# Patient Record
Sex: FEMALE | Race: WHITE
[De-identification: names, ages, dates, MRNs, and addresses within clinical notes are randomized per-mention and may not be internally consistent; named-entity substitution may affect disease eponyms.]

---

## 2017-03-07 NOTE — CT
EXAM DESCRIPTION: 

Abdomen/Pelvis w/wo Contrast



CLINICAL HISTORY: 

HEMATURIA



COMPARISON: 

None. 



TECHNIQUE: CT of the abdomen and pelvis was performed with and

without IV contrast.. Multiple axial images and multiplanar

reconstructions were generated. 



FINDINGS: 

Lung bases: The visualized lung bases are clear.



The spleen, pancreas and liver are unremarkable. There is

postcholecystectomy prominence of the common bile duct which

measures 7 mm in diameter. Bilateral adrenal hyperplasia noted.

No parenchymal renal stones on today's study. Bilateral kidneys

enhance symmetrically. No renal masses noted. The urinary bladder

and bilateral ureters are grossly unremarkable. Patient is status

post hysterectomy. Multiple phleboliths noted on today's study.

No lymphadenopathy. No free air or free fluid.



Atherosclerotic disease of the abdominal aorta. Negative for

aneurysm.



Diverticulosis of the colon. Negative for diverticulitis. Small

bowel is unremarkable.







IMPRESSION: 

1.  No findings to account for patient's hematuria. No renal mass

or renal stone noted.

2.  Patient is status post cholecystectomy with prominence of

common bile duct measuring 7 mm in diameter. The pancreatic head

is unremarkable.

3.  Atherosclerotic disease of the abdominal aorta without

aneurysm and diverticulosis of colon noted. 







Electronically signed by:  Tong Ruvalcaba MD  3/7/2017 8:58 AM CST

## 2017-03-15 NOTE — MRI
EXAM DESCRIPTION: 

Brain w/wo Contrast



CLINICAL HISTORY: 

MEMORY LOSS



COMPARISON: 

None available



TECHNIQUE: 

Non contrast MRI of the brain is performed according to our usual

protocol including multiplanar multi sequence technique.



FINDINGS: 

Encephalomalacia of the left temporooccipital lobe. This is

within the region of a watershed infarct. The flow voids on

today's exam are unremarkable, however. Minimal T2

hyperintensities noted within the remaining cerebral white

matter. No evidence of mass or mass effect. No abnormal

extra-axial fluid. The ventricles are midline and unremarkable.

The basal cisterns are widely patent.



No restricted diffusion noted on today's study to suggest an

acute infarct.



The orbits and globes are unremarkable. The paranasal sinuses and

mastoid air cells are clear.



Opacification of the right mastoid air cells. The incompletely

imaged nasopharynx is grossly unremarkable with no definitive

mass noted.





IMPRESSION: 

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 There is

now encephalomalacia within this region.



No acute infarct on today's exam.



Effusion of the right mastoid air cells.



Electronically signed by:  Tong Ruvalcaba MD  3/15/2017 1:13 PM

CDT

## 2017-09-18 NOTE — RAD
EXAM DESCRIPTION: 



Chest,1 View



CLINICAL HISTORY: 



Chest pain



COMPARISON: 



None.



Findings: 



Single portable upright frontal view of the chest.

Cardiac silhouette and pulmonary vascularity are within normal

limits.

Lungs are clear without focal consolidative infiltrates.

No pleural effusion. No pneumothorax.

No acute osseous abnormality. 



Impression:



No radiographic evidence for acute cardiopulmonary process.



Electronically signed by:  Javier Alvarado MD  9/18/2017 3:09 PM CDT

Workstation: [id number]

## 2017-09-18 NOTE — ED.PDOC
History of Present Illness





- General


Chief Complaint: Chest Pain/MI


Stated Complaint: CHEST PAIN


Time Seen by Provider: 09/18/17 14:46


Source: patient





- History of Present Illness


Initial Comments: 





PT REPORTS ONSET OF MID STERNAL CHEST PAIN DESCRIBED AS PRESSURE THIS AM.  PT 

DENIES RADIATION AND STATES THAT PAIN LASTED APPROXIMATELY 1.5HRS BEFORE 

SUBSIDING.  PT DENIES ASSOCIATED SYMPTOMS OF SOB, NAUSEA, DIAPHORESIS.  PT 

REPORTS CLEAN CARDIAC CATH IN 2014 AFTER HAVING A CVA.


Timing/Duration: 1 hour


Severity/Quality: moderate, pressure


Location: substernal


Chest Pain Radiation: no radiation


Activities at Onset: none


Prior Chest Pain/Cardiac Workup: cardiac cath - 2014-NORMAL


Improving Factors: nothing


Worsening Factors: nothing


Nitro Today/Relief: no nitro taken today


Associated Symptoms: denies symptoms


Allergies/Adverse Reactions: 


Allergies





ACE Inhibitors Allergy (Unverified 09/18/17 15:18)


 


Acetaminophen [From Vicodin] Allergy (Verified 09/18/17 15:18)


 


Aspirin [From Talwin Compound] Allergy (Unverified 09/18/17 15:18)


 


Cephalexin [From Keflex] Allergy (Unverified 09/18/17 15:18)


 


Codeine Allergy (Unverified 09/18/17 15:18)


 


Doxycycline Allergy (Unverified 09/18/17 15:18)


 


Hydrocodone [From Vicodin] Allergy (Verified 09/18/17 15:18)


 


Metaproterenol [From Alupent] Allergy (Unverified 09/18/17 15:18)


 


Naloxone [From Talwin Nx] Allergy (Unverified 09/18/17 15:18)


 


Oxycodone [From Percodan] Allergy (Unverified 09/18/17 15:18)


 


Pentazocine [From Talwin Nx] Allergy (Unverified 09/18/17 15:18)


 


Tramadol Allergy (Verified 09/18/17 15:18)


 


Lisinopril Adverse Reaction (Unverified 09/18/17 15:18)


 


Meloxicam [From Mobic] Adverse Reaction (Unverified 09/18/17 15:18)


 


Milnacipran [From Savella] Adverse Reaction (Unverified 09/18/17 15:18)


 








Home Medications: 


Ambulatory Orders





Azilsartan Medoxomil-Chlorthal [Edarbyclor 40-12.5 mg] 1 ea PO DAILY 06/17/14 


Cetirizine HCl [Zyrtec] 10 mg PO DAILY 06/17/14 


Cyanocobalamin Inj [Vitamin B-12 Inj] 1,000 mcg IM ONCE 06/17/14 


Fluticasone Propionate (Nasal) [Flonase] 50 mcg NA BID 06/17/14 


Ipratropium Bromide Hfa [Atrovent Hfa] 17 mcg IN BID PRN 06/17/14 


Pantoprazole Tablet [Protonix] 40 mg PO DAILY 06/17/14 


Pregabalin [Lyrica] 100 mg PO BID 06/17/14 


Tiotropium Bromide Monohydrate [Spiriva Handihaler] 18 mcg IN DAILY 06/17/14 


Tramadol HCl 100 mg PO Q6HRS PRN 06/17/14 


Bentyl  12/16/14 


Cyclobenzaprine HCl [Flexeril] 5 mg PO BEDTIME PRN #10 tab 12/16/14 











Review of Systems





- Review of Systems


Constitutional: Denies: chills, fever


EENTM: Denies: ear pain, nose congestion


Respiratory: Denies: cough, short of breath


Cardiology: States: see HPI, chest pain.  Denies: palpitations


Gastrointestinal/Abdominal: Denies: diarrhea, nausea


Genitourinary: Denies: dysuria, frequency


Musculoskeletal: Denies: joint pain, joint swelling


Skin: Denies: dryness, lesions


Neurological: Denies: headache, numbness


Endocrine: States: no symptoms reported


Hematologic/Lymphatic: States: no symptoms reported





Past Medical History (General)





- Patient Medical History


Hx Seizures: No


Hx Stroke: Yes


Hx Dementia: No


Hx Asthma: No


Hx of COPD: Yes


Hx Cardiac Disorders: No


Hx Congestive Heart Failure: No


Hx Pacemaker: No


Hx Hypertension: Yes


Hx Thyroid Disease: No


Hx Diabetes: No


Hx Gastroesophageal Reflux: No


Hx Renal Disease: No


Hx Cancer: No


Hx of HIV: No


Hx Hepatitis C: No


Hx MRSA: No


Surgical History: appendectomy, cholecystectomy





- Vaccination History


Hx Tetanus, Diphtheria Vaccination: Yes


Hx Influenza Vaccination: Yes


Hx Pneumococcal Vaccination: Yes


Immunizations Up to Date: No





- Social History


Hx Tobacco Use: Yes - 1PPD


Hx Chewing Tobacco Use: No


Hx Alcohol Use: No


Hx Substance Use: No


Hx Substance Use Treatment: No


Hx Depression: No


Feels Threatened In Home Enviroment: No


Feels Threatened In a Relationship: No


Hx Physical Abuse: No


Hx Emotional Abuse: No


Hx Suspected Abuse: No





- Activities of Daily Living


Hospice Agency (if applicable):: None





- Female History


Patient is a Female of Child Bearing Age (10 -59 yrs old): No


Patient Pregnant: No





Family Medical History





- Family History


  ** Sister


Family History: Unknown


Living Status: Unknown


Hx Family Diabetes: Yes


Hx Family Cancer: Yes





Physical Exam





- Physical Exam


General Appearance: Alert, Comfortable, No apparent distress, Well Developed, 

Well Groomed, Well Hydrated


Eyes, Ears, Nose, Throat Exam: normal ENT inspection


Neck: normal inspection


Respiratory: lungs clear, normal breath sounds, no respiratory distress


Cardiovascular/Chest: regular rate, rhythm, no murmur


Gastrointestinal/Abdominal: non tender, soft


Extremity: non-tender, normal inspection, no pedal edema


Neurologic: no motor/sensory deficits, alert, normal mood/affect, oriented x 3


Skin Exam: normal color, warm/dry





Progress





- Progress


Progress: 





09/18/17 14:20


PT RESTING COMFORTABLY NO RE-OCCURRENCE OF CHEST PAIN WHILE IN THE ED.


09/18/17 17:39


PT RESTING COMFORTABLY NO RE-OCCURRENCE OF CHEST PAIN.  2ND SET OF CARDIAC 

ENZYMES NEGATIVE.  





- Results/Orders


Results/Orders: 





 





09/18/17 14:46


IV Care:Saline Lock per Protoc QSHIFT 


Telemetry .ONCE 


Sodium Chloride 0.9% (Flush) [Saline Flush Syringe]   10 ml IV PRN PRN 


EKG Stat 


Pulse Ox Stat 


Pulse Oximetry Assessment DAILY 








 Laboratory Results - last 24 hr











  09/18/17 09/18/17 09/18/17





  14:50 16:35 16:35


 


WBC  8.0  


 


RBC  4.35  


 


Hgb  13.3  


 


Hct  39.6  


 


MCV  91.0  


 


MCH  30.6  


 


MCHC  33.6  


 


RDW  13.3  


 


Plt Count  256  


 


MPV  8.0  


 


Absolute Neuts (auto)  5.00  


 


Absolute Lymphs (auto)  2.40  


 


Absolute Monos (auto)  0.40  


 


Absolute Eos (auto)  0.10  


 


Absolute Basos (auto)  0.00  


 


Neutrophils %  62.5  


 


Lymphocytes %  30.1  


 


Monocytes %  5.6  


 


Eosinophils %  1.2  


 


Basophils %  0.6  


 


PT  10.9  


 


INR  0.960  


 


PTT (SP)  31.7  


 


Sodium  141  


 


Potassium  3.2 L  3.2 L 


 


Chloride  105  


 


Carbon Dioxide  29  


 


Anion Gap  10.2 L  


 


BUN  7  


 


Creatinine  0.74  


 


BUN/Creatinine Ratio  9.5 L  


 


Random Glucose  112 H  


 


Serum Osmolality  280.0  


 


Calcium  9.0  


 


Magnesium  2.0  


 


Creatine Kinase  86   81


 


CK-MB (CK-2)  1.2   1.1


 


CK-MB (CK-2) %  Not Reportable   Not Reportable


 


Troponin I  < 0.02   < 0.02


 


B-Natriuretic Peptide  10.0  














- EKG/XRAY/CT


EKG: Sinus - @68BPM, RBBB - NL AXIS, no ST T wave changes - NO OLD FOR 

COMPARISON


XRAY: chest - NO ACTIVE DISEASE AS PER RAD





Departure





- Departure


Clinical Impression: 


 Chest pain





Time of Disposition: 17:41


Disposition: Discharge to Home or Self Care


Condition: Good


Departure Forms:  ED Discharge - Pt. Copy, Patient Portal Self Enrollment


Instructions:  DI for Chest Pain


Referrals: 


Sammy Potts MD [Primary Care Provider] - 1-2 Weeks


Home Medications: 


Ambulatory Orders





Azilsartan Medoxomil-Chlorthal [Edarbyclor 40-12.5 mg] 1 ea PO DAILY 06/17/14 


Cetirizine HCl [Zyrtec] 10 mg PO DAILY 06/17/14 


Cyanocobalamin Inj [Vitamin B-12 Inj] 1,000 mcg IM ONCE 06/17/14 


Fluticasone Propionate (Nasal) [Flonase] 50 mcg NA BID 06/17/14 


Ipratropium Bromide Hfa [Atrovent Hfa] 17 mcg IN BID PRN 06/17/14 


Pantoprazole Tablet [Protonix] 40 mg PO DAILY 06/17/14 


Pregabalin [Lyrica] 100 mg PO BID 06/17/14 


Tiotropium Bromide Monohydrate [Spiriva Handihaler] 18 mcg IN DAILY 06/17/14 


Tramadol HCl 100 mg PO Q6HRS PRN 06/17/14 


Bentyl  12/16/14 


Cyclobenzaprine HCl [Flexeril] 5 mg PO BEDTIME PRN #10 tab 12/16/14

## 2017-11-11 NOTE — US
Procedure: Pelvic ultrasound.



CLINICAL INDICATION: Pelvic pain. Status post hysterectomy



Exam Date:  11/9/2017 10:11 AM CST



COMPARISON: None available.



TECHNIQUE: Transabdominal and endovaginal sonography was

performed



FINDINGS:

Status post hysterectomy.



The right ovary is normal in size and echogenicity.  Normal

arterial flow without solid or cystic mass lesion.



Status post left oophorectomy.



There is no free fluid.





IMPRESSION:



1. Status post hysterectomy and left oophorectomy. Otherwise,

unremarkable pelvic ultrasound. Normal-appearing right ovary.

 



Electronically signed by:  Star Starr MD  11/11/2017 5:58 AM

CST Workstation: [id number]

## 2018-11-10 NOTE — CT
EXAM DESCRIPTION:



 Abdomen/Pelvis w/o Contrast



CLINICAL HISTORY: 



58 years  Female  back pain and hematuria 



COMPARISON: 



Contrast enhanced study dated March 7, 2017.



TECHNIQUE: 



Noncontrast axial scans of the abdomen and pelvis were obtained.

The lack of any contrast administration limits evaluation of

certain areas. Sagittal and coronal reformatted images were

performed.

This exam was performed according to our departmental

dose-optimization program, which includes automated exposure

control, adjustment of the mA and/or kV according to patient size

and/or use of iterative reconstruction technique.



FINDINGS: 



The lung bases are unremarkable. Bilateral breast implants are

noted. The gallbladder is surgically absent. There is an apparent

small cyst in the left kidney, measuring about 7 mm, unchanged in

size and with a possible adjacent tiny calcification. Otherwise,

no renal, ureteral, or bladder calculi are identified, and there

is no evidence of obstructive uropathy on either side. The liver,

spleen, and pancreas appear essentially unremarkable, within

technical limitations of the study. Borderline mild biliary

ductal prominence is consistent with postcholecystectomy status

and appears unchanged. There appears to be a small low density

nodule in each adrenal gland, measuring up to about 9 mm and

between 0 and -10 Hounsfield units in density, most likely due to

adenomas and unchanged in size. No follow-up imaging is suggested

for these findings. There are aorto iliac calcifications, with no

evidence of abdominal aortic aneurysm. No gross para-aortic lymph

node enlargement is identified.



No obvious abnormal masses or fluid collections are seen in the

pelvis. The appendix is not clearly identified. There are

diverticula scattered throughout the colon, mostly on the left

side, with no evidence of diverticulitis, bowel obstruction, or

pneumoperitoneum. Diverticulosis may be minimally increased in

the interval. There is postoperative change in the pelvis,

including absence of the uterus. Calcified pelvic phleboliths are

noted. The bladder is unremarkable as visualized. Regional bony

structures appear intact.



IMPRESSION: 





1. No evidence of urinary tract obstruction or calculus except

for a possible tiny calcification adjacent to a small cyst in the

upper pole of the left kidney.

2. Diverticulosis in the colon.

3. Other stable appearing minor findings and chronic changes as

described above.



Electronically signed by:  Ezequiel Robin MD  11/10/2018 12:20

PM [facility] Workstation: [id number]

## 2018-11-10 NOTE — ED.PDOC
History of Present Illness





- General


Chief Complaint: Abdominal Pain


Stated Complaint: RLQ/flank discomfort


Time Seen by Provider: 11/10/18 10:13


Source: patient


Exam Limitations: no limitations





- History of Present Illness


Initial Comments: 


patient comes in today for three-day history of pain to the right flank area 

that radiates down to the right groin.  Patient states she did pull a wagon 

around her garden right before that happened but doesn't remember a specific 

incident where she felt it pull or hurt.  Patient states several hours after 

that she noted that she had a dull ache in the area that has progressively 

worsened over the past 3 days.  Patient states the pain is constant, not 

particularly worse with movement, and not associated with dysuria, fever, or 

chills.  Patient had no nausea or vomiting and she had normal bowel movement 

yesterday.  Patient has no history of kidney stones and last kidney infection 

was more than 30 years ago.  Patient has a past medical history for a stroke 

that left her with some aphasia and hypertension that she does not take 

medication for secondary to cost of her medicine.





Timing/Duration: other - 3 day history 


Severity: moderate


Improving Factors: nothing


Worsening Factors: nothing


Associated Symptoms: denies symptoms


Allergies/Adverse Reactions: 


Allergies





ACE Inhibitors Allergy (Unverified 09/18/17 15:18)


 


Acetaminophen [From Vicodin] Allergy (Verified 09/18/17 15:18)


 


Aspirin [From Talwin Compound] Allergy (Unverified 09/18/17 15:18)


 


Cephalexin [From Keflex] Allergy (Unverified 09/18/17 15:18)


 


Codeine Allergy (Unverified 09/18/17 15:18)


 


Doxycycline Allergy (Unverified 09/18/17 15:18)


 


Hydrocodone [From Vicodin] Allergy (Verified 09/18/17 15:18)


 


Metaproterenol [From Alupent] Allergy (Unverified 09/18/17 15:18)


 


Naloxone [From Talwin Nx] Allergy (Unverified 09/18/17 15:18)


 


Oxycodone [From Percodan] Allergy (Unverified 09/18/17 15:18)


 


Pentazocine [From Talwin Nx] Allergy (Unverified 09/18/17 15:18)


 


Tramadol Allergy (Verified 09/18/17 15:18)


 


Lisinopril Adverse Reaction (Unverified 09/18/17 15:18)


 


Meloxicam [From Mobic] Adverse Reaction (Unverified 09/18/17 15:18)


 


Milnacipran [From Savella] Adverse Reaction (Unverified 09/18/17 15:18)


 








Home Medications: 


Ambulatory Orders





Azilsartan Medoxomil-Chlorthal [Edarbyclor 40-12.5 mg] 1 ea PO DAILY 06/17/14 


Cetirizine HCl [Zyrtec] 10 mg PO DAILY 06/17/14 


Cyanocobalamin Inj [Vitamin B-12 Inj] 1,000 mcg IM ONCE 06/17/14 


Fluticasone Propionate (Nasal) [Flonase] 50 mcg NA BID 06/17/14 


Ipratropium Bromide Hfa [Atrovent Hfa] 17 mcg IN BID PRN 06/17/14 


Pantoprazole Tablet [Protonix] 40 mg PO DAILY 06/17/14 


Pregabalin [Lyrica] 100 mg PO BID 06/17/14 


Tiotropium Bromide Monohydrate [Spiriva Handihaler] 18 mcg IN DAILY 06/17/14 


Tramadol HCl 100 mg PO Q6HRS PRN 06/17/14 


Bentyl  12/16/14 


Cyclobenzaprine HCl [Flexeril] 5 mg PO BEDTIME PRN #10 tab 12/16/14 


Sulfa/Trimeth 800/160 (Ds) Tab [Bactrim DS] 1 tablet PO BID #20 tab 11/10/18 


amLODIPine BESYLATE [Norvasc] 5 mg PO DAILY #30 tab 11/10/18 











Review of Systems





- Review of Systems


Constitutional: States: no symptoms reported.  Denies: chills, fever, weakness


EENTM: States: no symptoms reported.  Denies: eye pain, nose pain, nose 

congestion, throat pain


Respiratory: States: no symptoms reported.  Denies: cough, short of breath, 

wheezing


Cardiology: States: no symptoms reported.  Denies: chest pain, edema, 

palpitations


Gastrointestinal/Abdominal: States: see HPI.  Denies: diarrhea, nausea, vomiting


Genitourinary: Denies: dysuria, frequency, hematuria


Musculoskeletal: States: back pain


Skin: States: no symptoms reported


Neurological: States: no symptoms reported





Past Medical History (General)





- Patient Medical History


Hx Seizures: No


Hx Stroke: Yes


Hx Dementia: No


Hx Asthma: No


Hx of COPD: Yes


Hx Cardiac Disorders: No


Hx Congestive Heart Failure: No


Hx Pacemaker: No


Hx Hypertension: Yes


Hx Thyroid Disease: No


Hx Diabetes: No


Hx Gastroesophageal Reflux: No


Hx Renal Disease: No


Hx Cancer: No


Hx of HIV: No


Hx Hepatitis C: No


Hx MRSA: No


Surgical History: appendectomy, cholecystectomy, Hysterectomy





- Vaccination History


Hx Tetanus, Diphtheria Vaccination: Yes


Hx Influenza Vaccination: Yes - 2017


Hx Pneumococcal Vaccination: Yes - 2017





- Social History


Hx Tobacco Use: Yes - 1PPD


Hx Chewing Tobacco Use: No


Hx Alcohol Use: No


Hx Substance Use: No


Hx Substance Use Treatment: No


Hx Depression: No


Hx Physical Abuse: No


Hx Emotional Abuse: No


Hx Suspected Abuse: No





- Female History


Patient Pregnant: No





Family Medical History





- Family History


  ** Sister


Family History: Unknown


Living Status: Unknown


Hx Family Diabetes: Yes


Hx Family Cancer: Yes





Physical Exam





- Physical Exam


General Appearance: Alert, No apparent distress


Eye Exam: bilateral normal


Ears, Nose, Throat: hearing grossly normal, normal ENT inspection, normal 

pharynx


Neck: non-tender, full range of motion, supple, normal inspection


Respiratory: chest non-tender, lungs clear, normal breath sounds


Cardiovascular/Chest: normal peripheral pulses, regular rate, rhythm, no edema, 

no murmur


Gastrointestinal/Abdominal: normal bowel sounds, non tender, soft


Back Exam: normal inspection, no CVA tenderness, no vertebral tenderness


Extremity: non-tender


Neurologic: alert, oriented x 3





Progress





- Progress


Progress: 





11/10/18 11:30


patient informed of initial results.  Will check CT for possible kidney stones.

  Still hurting but extensive list of allergies.  She can safely take Naproxen 

and believes Toradol has been fine in the past.  Will give Toradol shot now


11/10/18 12:32


discussed with patient her results. Will have her start Bactrim DS for UTI with 

hematuria and follow up with PCP.  Norvasc started for her BP and she will 

discuss if Neurontin might be used instead of Lyrica for her Fibromyalgia as 

the expense of medications makes her non compliant with her HTN medication.  





- Results/Orders


Results/Orders: 





 





11/10/18 10:56


URINE CULTURE W/COLONY COUNT Stat 





11/10/18 11:24


Abdoment/Pelvis w/o Contrast [CT] Stat 








 Laboratory Results











WBC  6.1 K/mm3 (4.8-10.8)   11/10/18  10:29    


 


RBC  4.85 M/mm3 (4.20-5.40)   11/10/18  10:29    


 


Hgb  14.9 gm/dL (12.0-16.0)   11/10/18  10:29    


 


Hct  44.2 % (36.0-47.0)   11/10/18  10:29    


 


MCV  91.2 fl (81.0-99.0)   11/10/18  10:29    


 


MCH  30.8 pg (27.0-31.0)   11/10/18  10:29    


 


MCHC  33.7 g/dL (33.0-37.0)   11/10/18  10:29    


 


RDW  13.3 % (11.5-14.5)   11/10/18  10:29    


 


Plt Count  306 K/mm3 (130-400)   11/10/18  10:29    


 


MPV  7.9 fl (7.40-10.4)   11/10/18  10:29    


 


Absolute Neuts (auto)  3.30 K/uL (1.8-6.8)   11/10/18  10:29    


 


Absolute Lymphs (auto)  2.20 K/uL (1.0-3.4)   11/10/18  10:29    


 


Absolute Monos (auto)  0.40 K/uL (0.2-0.8)   11/10/18  10:29    


 


Absolute Eos (auto)  0.10 K/uL (0.0-0.4)   11/10/18  10:29    


 


Absolute Basos (auto)  0.10 K/uL (0.0-0.1)   11/10/18  10:29    


 


Neutrophils %  54.8 % (42.0-78.0)   11/10/18  10:29    


 


Lymphocytes %  36.0 % (20.0-50.0)   11/10/18  10:29    


 


Monocytes %  6.7 % (2.0-9.0)   11/10/18  10:29    


 


Eosinophils %  1.5 % (1.0-5.0)   11/10/18  10:29    


 


Basophils %  1.0 % (0.0-2.0)   11/10/18  10:29    


 


Sodium  138 mmol/L (135-145)   11/10/18  10:29    


 


Potassium  3.4 mmol/L (3.6-5.0)  L  11/10/18  10:29    


 


Chloride  101 mmol/L (101-111)   11/10/18  10:29    


 


Carbon Dioxide  28 mmol/L (21-31)   11/10/18  10:29    


 


Anion Gap  12.4  (12-18)   11/10/18  10:29    


 


BUN  < 5 mg/dL (7-18)  L  11/10/18  10:29    


 


Creatinine  0.81 mg/dL (0.6-1.3)   11/10/18  10:29    


 


BUN/Creatinine Ratio  6.2  (10-20)  L  11/10/18  10:29    


 


Random Glucose  97 mg/dL ()   11/10/18  10:29    


 


Serum Osmolality  272.9 mOsm/L (275-295)  L  11/10/18  10:29    


 


Calcium  9.1 mg/dL (8.4-10.2)   11/10/18  10:29    


 


Total Bilirubin  0.6 mg/dL (0.2-1.0)   11/10/18  10:29    


 


AST  17 IU/L (10-42)   11/10/18  10:29    


 


ALT  10 IU/L (10-60)   11/10/18  10:29    


 


Alkaline Phosphatase  65 IU/L ()   11/10/18  10:29    


 


Serum Total Protein  6.6 gm/dL (6.4-8.2)   11/10/18  10:29    


 


Albumin  4.1 g/dl (3.2-5.5)   11/10/18  10:29    


 


Globulin  2.5 gm/dL (2.3-3.5)   11/10/18  10:29    


 


Albumin/Globulin Ratio  1.6  (1.1-1.9)   11/10/18  10:29    


 


Urine Color  Yellow  (Yellow)   11/10/18  10:56    


 


Urine Appearance  Clear  (Clear)   11/10/18  10:56    


 


Urine pH  7.0  (4.5-7.8)   11/10/18  10:56    


 


Ur Specific Gravity  1.010  (1.005-1.030)   11/10/18  10:56    


 


Urine Protein  Negative mg/dL  11/10/18  10:56    


 


Urine Glucose (UA)  Negative mg/dL (Negative)   11/10/18  10:56    


 


Urine Ketones  Negative mg/dL (NEGATIVE)   11/10/18  10:56    


 


Urine Blood  Moderate  (Negative)  H  11/10/18  10:56    


 


Urine Nitrite  Negative   11/10/18  10:56    


 


Urine Bilirubin  Negative  (NEGATIVE)   11/10/18  10:56    


 


Urine Urobilinogen  0.2 mg/dL (0.2-1.0)   11/10/18  10:56    


 


Ur Leukocyte Esterase  Moderate  (Negative)  H  11/10/18  10:56    


 


Urine RBC  1-3 /hpf  11/10/18  10:56    


 


Urine WBC  1-3 /hpf  11/10/18  10:56    


 


Ur Epithelial Cells  0 /hpf  11/10/18  10:56    


 


Urine Bacteria  0   11/10/18  10:56    








CT shows no acute process or obstruction, possible small calculus.  





Departure





- Departure


Clinical Impression: 


UTI (urinary tract infection)


Qualifiers:


 Urinary tract infection type: acute cystitis Hematuria presence: with 

hematuria Qualified Code(s): N30.01 - Acute cystitis with hematuria





Disposition: Discharge to Home or Self Care


Condition: Good


Departure Forms:  ED Discharge - Pt. Copy, Patient Portal Self Enrollment


Instructions:  DI for Abdominal Pain-Adult


Referrals: 


Sammy Potts MD [Primary Care Provider] - 1-2 Weeks


Prescriptions: 


amLODIPine BESYLATE [Norvasc] 5 mg PO DAILY #30 tab


Sulfa/Trimeth 800/160 (Ds) Tab [Bactrim DS] 1 tablet PO BID #20 tab


Home Medications: 


Ambulatory Orders





Azilsartan Medoxomil-Chlorthal [Edarbyclor 40-12.5 mg] 1 ea PO DAILY 06/17/14 


Cetirizine HCl [Zyrtec] 10 mg PO DAILY 06/17/14 


Cyanocobalamin Inj [Vitamin B-12 Inj] 1,000 mcg IM ONCE 06/17/14 


Fluticasone Propionate (Nasal) [Flonase] 50 mcg NA BID 06/17/14 


Ipratropium Bromide Hfa [Atrovent Hfa] 17 mcg IN BID PRN 06/17/14 


Pantoprazole Tablet [Protonix] 40 mg PO DAILY 06/17/14 


Pregabalin [Lyrica] 100 mg PO BID 06/17/14 


Tiotropium Bromide Monohydrate [Spiriva Handihaler] 18 mcg IN DAILY 06/17/14 


Tramadol HCl 100 mg PO Q6HRS PRN 06/17/14 


Bentyl  12/16/14 


Cyclobenzaprine HCl [Flexeril] 5 mg PO BEDTIME PRN #10 tab 12/16/14 


Sulfa/Trimeth 800/160 (Ds) Tab [Bactrim DS] 1 tablet PO BID #20 tab 11/10/18 


amLODIPine BESYLATE [Norvasc] 5 mg PO DAILY #30 tab 11/10/18 








Additional Instructions: 


follow up with PCP on Monday for hematuria and HTN.  Return to ER for increase 

in pain, intractable emesis

## 2018-12-17 NOTE — CT
Procedure:  CT LUNG SCREENING        



Exam Date:  7/17/2018.



Ordering Provider:  Júnior Saez



Clinical Indication:  HX OF TOBACCO USE. 40 pack years. This

patient meets eligibility criteria for low-dose CT lung cancer

screening.



Comparison: CT scan of the chest without contrast 2/19/2015.



Technique:  Using a multislice scanner, sequential helical axial

imaging was obtained in the thorax, 2.5 mm thickness, 2.5 mm

separation, from the level of the thoracic inlet through the lung

bases without IV contrast. A low dose protocol was utilized:

CTDI: 1.75 mGy.  120. kVp.  45 mA.  DLP: 63.94 mGy-centimeters.

2D sagittal and coronal reconstructed images, 6.0 mm thickness,

were obtained. This exam was performed according to our

departmental dose optimization program which includes use of

automated exposure control, adjustment of the mA and/or kV

according to patient size and/or use of iterative reconstruction

technique.  Nodule measurements under 10 mm are given as mean

value of 3 axes diameters.



FINDINGS: 

Lungs and large airways: Bilateral minimal pulmonary blebs in a

centrilobular pattern. 8 mm groundglass density associated with

the posterior lateral pleura in the right upper lobe on axial

sequence 2, image 19. Scarring in the posterior medial recess of

the right lower lobe abutting the posterior right hemidiaphragm

in the posterior right pleural recess. Minimal pleural

parenchymal scarring in the medial left lower lobe base. No

enlarged solid or semisolid nodules, no masses or infiltrates

bilaterally.



Pleura: Bilateral regions of focal thickening. No calcification.

No bilateral effusion or pneumothorax



Mediastinum and ana: evaluation limited by low dose technique

and lack of IV contrast.  Negative.



Heart and great vessels: Atherosclerotic calcification and some

of the brachiocephalic vessels and the thoracic aorta including

the arch.



Chest wall, lower neck, axillae: Evaluation also limited by same

factors as described above.  Bilateral retromuscular saline

implants. No complications are noted. Bilateral calcifications

anterior to the implants. Bilateral small nodes abutting the

lateral implants.



Upper abdomen: No fluid or free air in the peritoneal space.

Normal CT density and size of the adrenal glands and spleen.

Stomach distended with food.



Osseous structures: Evaluation limited by low dose MIP technique.

 No blastic or lytic lesions. Minimal spondylosis of the thoracic

spine and trace dextroscoliosis.



IMPRESSION:



1. 8-mm groundglass density associated with the lateral posterior

pleura in the right apex slightly larger compared to the prior

study which is most likely due to different technique. No

abnormal parenchymal nodules, masses or infiltrates. No pleural

effusion or pneumothorax. Bilateral saline intramuscular breast

implants.  Recommendations based upon [employer] [facility] [employer]

guidelines and lung RADS system. Please see below for Lung RADS

category and FOLLOW-UP.*



*Lung RADS category CATEGORY 2- Nodules with a very low

likelihood (less than 1%) of becoming a clinically active cancer

due to size or lack of growth. 



Nodules: Solid or part solid nodule(s) less than 6mm, new solid

nodule less than 4mm.  Ground glass nodule(s) less than 20mm or

unchanged or slow growing ground glass nodule 20mm or greater.

Cat 3 or 4 nodule unchanged for 3 or more months. 



FOLLOW-UP:  Continue annual screening with a Low Dose Chest CT in

12 months for re-evaluation. 



Electronically signed by:  Xu Rangel MD  12/17/2018 1:14 PM

[facility] Workstation: [id number]

## 2019-04-24 NOTE — US
EXAM DESCRIPTION: Carotid Duplex



CLINICAL HISTORY: HX TIA



COMPARISON: None Available.



TECHNIQUE: Carotid Doppler ultrasound



FINDINGS:



Right



Submitted images show soft plaque at the right carotid

bifurcation.



The following flow velocities were obtained:



Common carotid artery peak systolic flow velocity measures 89

centimeters per second.



Internal carotid artery peak systolic flow velocity measures

55-82 centimeters per second.



External carotid artery peak systolic flow velocity measures 59

centimeters per second.



Flow in the right vertebral artery is antegrade.



The right internal carotid to common carotid peak systolic flow

velocity ratio equals 0.9 which is normal.



Left



Submitted images show mild soft plaque at the left carotid

bifurcation.



The following flow velocities were obtained:



Common carotid artery peak systolic flow velocity measures 74-81

centimeters per second.



Internal carotid artery peak systolic flow velocity measures

74-84 centimeters per second.



External carotid artery peak systolic flow velocity measures 55

centimeters per second.



Flow in the left vertebral artery is antegrade.



The left internal carotid to common carotid peak systolic flow

velocity ratio of 1.0  is normal.



IMPRESSION:



No hemodynamically significant stenosis.



Electronically signed by:  Sean Hunter MD  4/24/2019 2:32

PM CDT Workstation: [id number]

## 2019-04-24 NOTE — MRI
EXAM DESCRIPTION: Brain w/oContrast



CLINICAL HISTORY: G51.0



COMPARISON: None available



TECHNIQUE: Non contrast MRI of the brain is performed according

to our usual protocol including multiplanar multi sequence

technique.



FINDINGS:



Sagittal T1 images show intact corpus callosum. Normal pituitary

gland with normal T1 appearance of the andre and medulla and upper

cervical cord. Normal signal intensity within the clivus and

calvarium.



Axial T2 fat sat images reveal preservation of intracranial

vascular flow voids. Normal gray matter T2 signal intensity.

Patchy increased signal intensity in the periventricular white

matter and in the white matter of the left parietal lobe at the

site of an old infarction. Prominent ventricles and sulci

consistent with age-related cerebral volume loss. The globes

appear intact and symmetrical. No abnormal  fluid signal in the

paranasal sinuses, tympanic cavities or left mastoid air cells.

Minimal fluid in the inferior right mastoid air cells.



Axial flair images show scattered foci of increased signal

intensity in the subcortical and central white matter of both

cerebral hemispheres. Old infarction of the left parietal lobe

involves white matter more than gray matter.



Diffusion weighted images are negative for  focal intense

increased signal intensity in the brain parenchyma to suggest

restricted diffusion. ADC mapping is negative.



Axial T1 images show normal gray-white matter differentiation. No

high signal intensity hemorrhagic lesion of the brain parenchyma.

No subdural hematoma.



Axial susceptibility weighted images are negative for focal

signal loss to suggest abnormal brain parenchymal calcification

or hemosiderin deposition.



IMPRESSION:



No acute intracranial pathologic process.



Old left parietal infarction.



Chronic microvascular ischemic changes in cerebral white matter.





Electronically signed by:  Sean Hunter MD  4/24/2019 2:40

PM CDT Workstation: [id number]

## 2019-10-28 NOTE — RAD
EXAM DESCRIPTION: Chest,2 Views



CLINICAL HISTORY: COUGH



COMPARISON: Previous study September 18, 2017



TECHNIQUE: PA/lateral



FINDINGS: Bilateral breast implants. Heart size is normal with

normal pulmonary vascularity. No pleural effusion or

pneumothorax. Lungs are clear with no consolidating infiltrate.

Lateral view shows intact sternum and T-spine.



IMPRESSION:



No acute process is identified in the chest.



Electronically signed by:  Sean Hunter MD  10/28/2019 2:00

PM CDT Workstation: [id number]

## 2019-12-30 NOTE — CT
Procedure:  CT LUNG SCREENING        



Exam Date:  12/30/2019.



Ordering Provider:  Júnior Saez



Clinical Indication:  PERSONAL HISTORY OF TOBACCO USE . Current

cigarette smoker. 47 pack-years. This patient meets eligibility

criteria for low-dose CT lung cancer screening.



Comparison: Low-dose CT lung cancer screening examination

December 2018.



Technique:  Using a multislice scanner, sequential helical axial

imaging was obtained in the thorax, 2.5 mm thickness, 2.5 mm

separation, from the level of the thoracic inlet through the lung

bases without IV contrast. A low dose protocol was utilized for

BMI less than 30: BMI: 29.  CTDI: 1.76 mGy.  120. kVp.  45 mA. 

DLP 63.3 mGy-cm.  2D sagittal and coronal reconstructed images,

6.0 mm thickness, were obtained. This exam was performed

according to departmental dose optimization program which

includes use of automated exposure control, adjustment of the mA

and/or kV according to patient size and/or use of iterative

reconstruction technique.  Nodule measurements under 10 mm are

given as mean value of 3 axes diameters.



FINDINGS: 

Lungs and large airways: 8 mm groundglass nodule versus pleural

thickening showing no interval change on axial image 2/21-22.

Bilateral subpleural densities in the apices are stable.

Bilateral small uniform blebs more prevalent in the upper lung

fields. Bilateral focal thickening of the fissures is stable.

Minimal pleural-parenchymal thickening in the base of the left

lower lobe. No new nodules or masses. No new focal infiltrates.



Pleura and space: Calcification pleura right lateral

hemidiaphragm stable. Bilateral focal pleural thickening more

prevalent in the apices stable.



Mediastinum and ana: evaluation limited by low dose technique

and lack of IV contrast.  Small lymph nodes no dominant masses.

Stable.



Heart and great vessels: Atherosclerotic calcification left main

coronary artery and also aortic arch. No cardiomegaly.



Chest wall, lower neck, axillae: Evaluation also limited by same

factors as described above.  Bilateral retro-muscular saline

implants with bilateral calcifications associated with the

fibroglandular tissues. Capsular margins appear intact. No new

lymph nodes or soft tissue masses.



Upper abdomen: Evaluation limited by low-dose technique. No free

air or free fluid. Slight prominence of the left adrenal gland

compared to the right stable since the prior study with

Hounsfield density approximately 0 - +3. Surgical clips in the

gallbladder fossa with no fluid. Included organs normal density.



Osseous structures: Evaluation limited by low dose MIP technique.

 Minimal spondylosis in the thoracic spine. No lytic or blastic

lesions.



IMPRESSION: 

1. Stable 8 mm groundglass nodule in the subpleural right upper

lobe versus pleural thickening. No new nodules or masses. No new

focal infiltrates..  [facility] Best [facility]

Recommendations:  please see below for Lung RADS category and

FOLLOW-UP.*



*Lung RADS category CATEGORY 2- Nodules with a very low

likelihood (less than 1%) of becoming a clinically active cancer

due to size or lack of growth. 



Nodules: Perifissural nodule(s) < 10 mm. (526mm3). Solid or part

solid nodule(s) less than 6mm (113.1 mm3), new solid nodule less

than 4mm (33.5 mm3).  Ground glass nodule(s) less than 30mm

(32762.2 mm3)  or unchanged or slow growing ground glass nodule

30mm or greater. Cat 3 or 4 nodule unchanged for 3 or more

months. 



FOLLOW-UP:  Continue annual screening with a Low Dose Chest CT in

12 months for re-evaluation. 



2. According to PACS history at [facility], patient has not had breast

screening mammography examination since April 2017. Consider

re-enrollment of patient into [facility] breast screening mammography

program, if not performed elsewhere in the past 12 months.



Electronically signed by:  Xu Rangel MD  12/30/2019 5:52 PM

[facility] Workstation: [id number]

## 2020-06-03 ENCOUNTER — HOSPITAL ENCOUNTER (OUTPATIENT)
Dept: HOSPITAL 39 - LAB.O | Age: 60
End: 2020-06-03
Attending: FAMILY MEDICINE
Payer: MEDICARE

## 2020-06-03 DIAGNOSIS — I63.9: ICD-10-CM

## 2020-06-03 DIAGNOSIS — I10: Primary | ICD-10-CM

## 2020-06-03 DIAGNOSIS — Z13.220: ICD-10-CM

## 2020-06-03 DIAGNOSIS — R41.3: ICD-10-CM

## 2020-06-10 ENCOUNTER — HOSPITAL ENCOUNTER (OUTPATIENT)
Dept: HOSPITAL 39 - CT | Age: 60
End: 2020-06-10
Attending: FAMILY MEDICINE
Payer: MEDICARE

## 2020-06-10 DIAGNOSIS — R31.9: Primary | ICD-10-CM

## 2020-06-10 NOTE — CT
EXAM DESCRIPTION: Abdomen/Pelvis w/wo Contrast



CLINICAL HISTORY: 59 years Female, BLOOD IN URINE HEMATURIA



TECHNIQUE: This exam was performed according to our departmental

dose-optimization program, which includes automated exposure

control, adjustment of the mA and/or kV according to patient size

and/or use of iterative reconstruction technique.



COMPARISON: March 7, 2017



FINDINGS:

Visualized lung bases are grossly unremarkable.



The liver is unremarkable. No suspicious hepatic lesion. No

biliary dilatation. Cholecystectomy. The portal vein is patent.



Stable benign bilateral adrenal adenomas. The spleen and pancreas

are unremarkable.



No hydronephrosis. No urolithiasis. Symmetric renal parenchymal

enhancement. No suspicious renal lesion identified. Unremarkable

bladder. Hysterectomy. No suspicious adnexal mass. Scattered

colonic diverticula without focal inflammatory change. No

evidence of bowel obstruction. No findings to suggest

appendicitis.



No adenopathy. No focal fluid collection. No free air. Normal

caliber abdominal aorta. Mild atherosclerotic disease.



No acute or suspicious osseous abnormality. Scattered

degenerative changes present.



IMPRESSION:

No CT findings to account for hematuria.



Electronically signed by:  Varghese Miramontes MD  6/10/2020 11:39 AM

CDT Workstation: [id number]

## 2020-12-02 ENCOUNTER — HOSPITAL ENCOUNTER (OUTPATIENT)
Dept: HOSPITAL 39 - LAB.O | Age: 60
End: 2020-12-02
Attending: FAMILY MEDICINE
Payer: MEDICARE

## 2020-12-02 DIAGNOSIS — F03.90: ICD-10-CM

## 2020-12-02 DIAGNOSIS — E78.5: ICD-10-CM

## 2020-12-02 DIAGNOSIS — R53.83: ICD-10-CM

## 2020-12-02 DIAGNOSIS — I10: Primary | ICD-10-CM

## 2020-12-16 ENCOUNTER — HOSPITAL ENCOUNTER (OUTPATIENT)
Dept: HOSPITAL 39 - LAB.O | Age: 60
End: 2020-12-16
Attending: FAMILY MEDICINE
Payer: MEDICARE

## 2020-12-16 DIAGNOSIS — Z12.31: Primary | ICD-10-CM

## 2020-12-16 DIAGNOSIS — R82.90: ICD-10-CM

## 2020-12-20 NOTE — MAM
EXAM DESCRIPTION: 

3D Screening BILATERAL : Digital Mammography.



CLINICAL HISTORY: 

60 years Female ANNUAL SCREENING .. No complaints. Sister with

breast cancer age 57. Menarche age 12. Childbirth age 18.

Menopause age 40. No HRT. Lifetime risk of developing breast

cancer (Tyrer-Cuzick model)(%):  12.8.



COMPARISON: 

Bilateral screening digital breast 2-D imaging with Noni

implant displacement technique 2017    



TECHNIQUE: 

Bilateral CC and MLO projection full-field images, with Noni

Implant Displacement digital tomosynthesis mammographic

technique. Bilateral 2-D digital full-field images, MLO and CC

projections, non-displaced. Bilateral digital 2-D full-field MLO

images.  Implant displacement technique.  CAD available for 2-D

images.  



FINDINGS: 

The breast parenchymal density pattern is:  Scattered areas of

fibroglandular density.  Bilateral saline implants placed

retro-muscular position. Stable position. Increasing folds in the

capsule and substance since the prior study. Calcifications

anterior to the left implant are stable.  No skin thickening or

nipple retraction

No new focal, stellate mass or density, focal asymmetry , and no

suspicious microcalcifications bilaterally. Stable mammograms

compared to prior study.  Taking into account, differences in

mammographic technique.



IMPRESSION: 

Benign exam.



BIRAD CATEGORY: 2 BENIGN FINDINGS.



RECOMMENDATIONS:

FOLLOW UP: Routine digital bilateral  mammographic screening, one

year interval from  December 2020.



Written communication explaining the IMPRESSION and follow-up,

will be mailed to the patient and referring health care provider.

 



According to the [employer], yearly mammograms

are recommended starting at age 40 and continuing as long as a

woman is in good health.  Any breast change noted on a breast

self-exam should be reported promptly to the patient's healthcare

provider.  Breast MRI is recommended for women with an

approximately 20-25% or greater lifetime risk of breast cancer,

including women with a strong family history of breast or ovarian

cancer and women who have been treated for Hodgkin's disease.  A

negative mammographic report should not delay tissue diagnosis in

patients with significant clinical history or physical findings. 

Extremely dense breast tissue limits the sensitivity of digital

mammography. 





Electronically signed by:  Xu Rangel MD  12/20/2020 4:34 PM

[facility] Workstation: [id number]

## 2021-01-11 ENCOUNTER — HOSPITAL ENCOUNTER (OUTPATIENT)
Dept: HOSPITAL 39 - CT | Age: 61
End: 2021-01-11
Attending: FAMILY MEDICINE
Payer: MEDICARE

## 2021-01-11 DIAGNOSIS — J43.9: ICD-10-CM

## 2021-01-11 DIAGNOSIS — F17.200: Primary | ICD-10-CM

## 2021-01-11 DIAGNOSIS — Z12.2: ICD-10-CM

## 2021-01-11 NOTE — CT
Procedure:  CT LUNG SCREENING        



Exam Date:  January 11, 2021.



Ordering Provider:  Júnior Saez



Clinical Indication:  HX OF TOBACCO USE . Current cigarette

smoker. 50 pack-year history. This patient meets eligibility

criteria for low-dose CT lung cancer screening.



Comparison: Low-dose CT lung cancer screening examinations

December 2019 and December 2018



Technique:  Using a multislice scanner, sequential helical axial

imaging was obtained in the thorax, 2.5 mm thickness, 2.5 mm

separation, from the level of the thoracic inlet through the lung

bases without IV contrast. A low dose protocol was utilized for

BMI less than 30: BMI: 22.  CTDI: 1.76 mGy.  120. kVp.  45 mA. 

DLP 66 mGy-cm.  2D sagittal and coronal reconstructed images, 6.0

mm thickness, were obtained. This exam was performed according to

our departmental dose optimization program which includes use of

automated exposure control, adjustment of the mA and/or kV

according to patient size and/or use of iterative reconstruction

technique.  Nodule measurements under 10 mm are given as mean

value of 3 axes diameters.



FINDINGS: 

Lungs and large airways: Mildly prominent airspaces and small

blebs in the upper lobes with no interval change. Bilateral

subpleural small solid densities and groundglass densities 3 mm

or less in diameter are stable. No abnormal nodules and no

masses. No acute or interval development of infiltrate.



Pleura and space: Focal and confluent thickening bilaterally but

no acute process.



Mediastinum and ana: evaluation limited by low dose technique

and lack of IV contrast.  Small nodes but no dominant soft tissue

masses. No interval change.



Heart and great vessels: Atherosclerotic calcifications aorta.

Small focal coronary artery calcification proximal LAD stable.



Chest wall, lower neck, axillae: Evaluation also limited by same

factors as described above.  Bilateral retro-muscular saline

implants. Unremarkable. Bilateral calcifications in the breast

tissue anterior to the implants. Axillary and subclavian nodes

are unremarkable.



Upper abdomen: Evaluation limited by low-dose technique. No free

fluid or free air. Normal size and density of the adrenal glands

and spleen. Surgical clips gallbladder fossa with no fluid.

Included pancreas and liver are negative. Aortic atherosclerotic

calcifications.



Osseous structures: Evaluation limited by low dose MIP technique.

 Minimal thoracic endplate spondylosis. Minimal arthrosis show

the sternoclavicular joints.



IMPRESSION: Minimal emphysematous changes upper lung fields.

Stable since the prior study. No abnormal nodules and no new

mass. No focal infiltrate or interval development of infiltrate..

 [facility] Best Practice Recommendations:  please see

below for Lung RADS category and FOLLOW-UP.*



*Lung RADS category Category 1 - No nodule or definitely benign

nodules (probability of malignancy less than 1%).



Follow-up:  Continue annual screening with Low Dose Chest CT in

12 months. 



Electronically signed by:  Xu Rangel MD  1/11/2021 3:30 PM [facility], [place]

Workstation: [id number]

## 2021-01-26 ENCOUNTER — HOSPITAL ENCOUNTER (OUTPATIENT)
Dept: HOSPITAL 39 - MRI | Age: 61
End: 2021-01-26
Payer: MEDICARE

## 2021-01-26 DIAGNOSIS — J32.9: ICD-10-CM

## 2021-01-26 DIAGNOSIS — G43.019: ICD-10-CM

## 2021-01-26 DIAGNOSIS — M50.33: ICD-10-CM

## 2021-01-26 DIAGNOSIS — M50.122: ICD-10-CM

## 2021-01-26 DIAGNOSIS — G60.3: ICD-10-CM

## 2021-01-26 DIAGNOSIS — F01.50: Primary | ICD-10-CM

## 2021-01-26 DIAGNOSIS — M48.02: ICD-10-CM

## 2021-01-26 DIAGNOSIS — M50.121: ICD-10-CM

## 2021-01-26 DIAGNOSIS — M50.11: ICD-10-CM

## 2021-01-26 DIAGNOSIS — M50.13: ICD-10-CM

## 2021-01-26 DIAGNOSIS — M50.123: ICD-10-CM

## 2021-01-26 DIAGNOSIS — I63.312: ICD-10-CM

## 2021-01-27 NOTE — MRI
EXAM DESCRIPTION: 

Brain w/o Contrast: MRI.



CLINICAL HISTORY: 

VASCULAR DEMENTIA



COMPARISON: 

MRI scan of the brain without contrast April 2019.



TECHNIQUE: 

Multiplanar, high-field MRI unit, multiple diffusion sequences,

multiple conventional sequences without contrast.



FINDINGS: 

Focal somewhat complex signal abnormality in the subcortical

white matter of the left occipital lobe associated with focal

cortical atrophy and prominent cortical sulci. Hyperintense FLAIR

and T2 and T2*gradient gliosis. Hypointense signal on T1 and

diffusion weighted sequence B-1000. Ventricular white matter

tracks abutting the left occipital horn of the lateral ventricle.

Stable since the prior study. No extra-axial component.



Bilateral multiple foci of hyperintense FLAIR and T2-weighted

signal in the subcortical white matter predominantly at, and

above the level of the lateral ventricles extending almost to the

vertex. Relative sparing of the temporal and occipital lobes.

Smallest focal lesion is in the left frontal subcortical white

matter anterior to the basal ganglia.. No hemorrhage, no cerebral

edema, no midline shift.. Stable since the prior study. Normal

signal in the bilateral basal ganglia.  Normal signal in the

brainstem and cerebellar hemispheres..

 

Concordance of the diffusion and non-diffusion sequences with no

diffusion restriction. Cortical sulci, ventricles, and other CSF

spaces, and the subdural spaces are otherwise unremarkable.. No

effacement or displacement. No midline shift. No extra-axial

hemorrhage.

 

Unusual flow signal void in the right temporal lobe associated

with the first posterior turn of the right middle cerebral

artery. Hypointense signal in the adjacent temporal lobe on the

T1 and FLAIR sequences. Minimal atrophy of the adjacent cortical

gray matter and subcortical white matter. No interval change from

the prior study. Otherwise, the remaining major vessels of the

[place], and the venous sinuses. IACs are symmetric

bilaterally. Normal signal in the bilateral mastoid air cells,

representing improvement from the mastoiditis seen previously. No

mass effect in the bilateral cerebellopontine angles.   Pituitary

gland occupies the base of the sella. Base of the cerebellar

tonsils is at the level of the foramen magnum. Minimal chronic

mucosal thickening in the paranasal sinuses. The bony calvarium

is intact.



IMPRESSION: 

1. 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

lobes stable since the prior study. No diffusion restriction.

2. Probable subcortical white matter lesions bilaterally

predominantly in the parietal and frontal lobes also stable.

3. Bilateral mastoiditis has resolved since the prior study.

Small gland is stable. Minimal chronic paranasal sinusitis is

stable. 



Electronically signed by:  Xu Rangel MD  1/27/2021 11:42 AM

[facility] Workstation: [id number]

## 2021-01-27 NOTE — MRI
EXAM DESCRIPTION: 

Cervical Spine: MRI.



CLINICAL HISTORY: 

60 years Female VASCULAR DEMENTIA



COMPARISON: 

MRI scan of the brain on the same visit. MRI scan cervical spine

August 2016 not available for comparison.



TECHNIQUE: 

Multiplanar, high-field MRI, multiple sequences, non-contrast 

Cervical spine.



FINDINGS: 

C3-C4: Disc desiccation minimal posterior disc space loss and

left posterior bulge abutting the left ventral cord and the left

C4 nerve. Left neuroforamen is patent. Right neural foramen

narrowed by uncinate spur. Facet joints are negative.



C4-C5: Posterior disc space loss with disc desiccation and

minimal anterior bulging. Tiny disc bulge into the left

neuroforamen with mild to moderate narrowing. Right neural

foraminal stenosis with right uncinate spur. Mild canal

narrowing.



C5-C6: Disc desiccation and diffuse moderate disc space loss.

Minimal anterior bulging and posterior bulging with minimal C6

superior endplate erosion. Bilateral uncinate spurs and bulging

of the disc into the left neural foramen. Moderate canal

narrowing. Bilateral neural foraminal stenosis. Facet joints are

negative.



C6-C7: Disc desiccation and minimal to moderate disc space loss.

Tiny posterior and anterior bulges. Mild canal narrowing.

Bilateral mild neuroforaminal narrowing. Facet joints are

negative.



C7-T1: Disc desiccation and posterior bulge. Minimal disc space

loss. Minimal bulge also into the bilateral neural foramina.

Bilateral mild neuroforaminal narrowing and mild canal narrowing.



Normal signal in the C2-C3 disc and T1-T2 disc with no bulging.

Disc spaces preserved. Canal and neural foramina are patent.

Facet joints unremarkable.



Spinal alignment negative..  No cord compression or cord edema. 

Atlantoaxial joint mild arthrosis and enlarged capsule with

minimal narrowing of the anterior spinal canal. No encroachment

on the cord. Base of the cerebellar tonsils is just above the

foramen magnum.  Paravertebral soft tissues bilateral lymph nodes

posterior to the submandibular gland is not enlarged. No edema.

Vertebral bodies are not compressed at any level. Otherwise

normal marrow signal in the remaining vertebral bodies and the

posterior elements.



IMPRESSION: 

1. Multiple levels of desiccated discs and bulging.

2. Left posterior bulge of the C3-C4 disc abutting the left

ventral cord and the left C4 nerve.

3. Bilateral C5-C6 neural foraminal stenosis by uncinate spurs

and bulging disc into the left neural foramen.

4. Please refer to FINDINGS for discussion of results at other

disc space levels.   

. 



Electronically signed by:  Xu Rangel MD  1/27/2021 12:24 PM

[facility] Workstation: [id number]

## 2022-02-09 NOTE — MAM
History: Well woman exam.



Date of exam: 4/7/2017 



Services provided: Bilateral full field digital screening

mammography.

CAD, the images were reviewed with R2 computer aided detection.



FINDINGS: Glandular tissue is scattered glandular pattern. Study

is compared to 2014 study. Bilateral subpectoral implants are in

place, mammographically unremarkable. Stable radiographic

pattern. No dominant mass, architectural distortion or clustered

microcalcification.



IMPRESSION:  Benign exam



Recommendation: Routine annual mammography



BIRAD CATEGORY: 2 BENIGN





Electronically signed by:  Priyanka Mann MD  4/8/2017 3:58 PM CDT [___] : [unfilled] [LVEF ___%] : LVEF [unfilled]% [None] : no mitral regurgitation 09-Feb-2022